# Patient Record
Sex: FEMALE | Race: WHITE | URBAN - METROPOLITAN AREA
[De-identification: names, ages, dates, MRNs, and addresses within clinical notes are randomized per-mention and may not be internally consistent; named-entity substitution may affect disease eponyms.]

---

## 2022-01-04 NOTE — PROGRESS NOTES
Neurology Note    Patient ID:  Jose Antonio Sommer  [id number]  35 y.o.  1945      Date of Consultation:  January 5, 2022    Referring Physician: Dr. Patel Cazares    Reason for Consultation:  neuropathy      Assessment and Plan:    The patient is a pleasant 57-year-old female who presents with a 4+ year history of sensory disturbance in her bilateral lower extremities. Her examination is notable for a length dependent peripheral neuropathy. Peripheral neuropathy:    The differential includes metabolic, inflammatory, vitamin deficiency, idiopathic  I will obtain an EMG/nerve conduction study to determine the severity of the neuropathy and nerve type that is involved. I will obtain serology looking for reversible causes of neuropathy. This will include a vitamin B12, serum immunofixation, thyroid panel. Neuropathy:  we reviewed the causes contributing to the neuropathy. We discussed the importance of exercise and activity. I also reviewed the importance of safety with ambulation and ways to prevents falls. Neuropathic pain:  I did discuss with her medications that can be used for her neuropathic pain symptoms. I discussed medication such as gabapentin, Lyrica, amitriptyline, Cymbalta. She does not feel that the symptoms are severe enough to require a medication at this time. She will keep me updated and if symptoms worsen prescription medications or over-the-counter medications can be considered. Subjective: my feet and legs. History of Present Illness:   Jose Antonio Sommer is a 64 y.o. female who was referred to the neurology clinic at [facility] for an evaluation. The patient does present with her  today who provides additional information. The patient stated that she began to notice crawling sensation around her legs approximately 4 to 5 years ago.   Initially there was a concern about vascular issues and she had vascular testing but those were normal.  Symptoms were in her calves and it felt like a squeezing tight sensation. She then began to notice more symptoms in her toes. At times they feel tight with rubber bands wrapped around them. At other times her foot will feel cold and wet when it is not. She occasionally notices this when walking but also when seated. All of her symptoms are much worse at night. She does not think it wakes her up from sleep but if she does wake up she does notice the symptoms. She does feel that her balance is slightly worse but does attribute this to her vision and hearing issues. She did get new glasses due to double vision and this has helped. She denies having a shower sign. She does not have a dedicated exercise program but does try to stay active. Ophthalmologist did obtain a brain MRI with and without contrast that was performed on October 28, 2021. There is no evidence of a stroke. There was a mild amount of chronic periventricular microvascular ischemic changes    Upon a review of the medical records, the patient was seen by a neurologist in [address] [place] [address]. At that time she had abnormal sensation in her bilateral lower extremities that was up to her knees. It was felt that she had a peripheral neuropathy. Laboratory testing was obtained. Laboratory results available in [facility] included a normal comprehensive metabolic panel, normal CBC. HDL 74, , vitamin D was 22. These labs were all obtained in June 2021. In August 2020 her vitamin B12 level was 297. Past medical history:  Anxiety/depression  Dyslipidemia  Vitamin D deficiency. Gets a migraine once a year. Surgical history:  Left wrist surgery      Family history:  No neuropathy in her family. Social History     Tobacco Use    Smoking status: denies    Smokeless tobacco:    Substance Use Topics    Alcohol use: 0 percent etoh beer.         Not on File     Prior to Admission medications    Medication Sig Start Date End Date Taking? Authorizing Provider   escitalopram oxalate (LEXAPRO) 5 mg tablet  12/14/21  Yes Provider, Historical   cholecalciferol (Vitamin D3) (1000 Units /25 mcg) tablet Take  by mouth daily. Yes Provider, Historical       Review of Systems:    General, constitutional: negative  Eyes, vision: vision difficulties - has 3 pairs of glasses  Ears, nose, throat: hearing loss  Cardiovascular, heart: negative  Respiratory: negative  Gastrointestinal: negative  Genitourinary: negative  Musculoskeletal: negative  Skin and integumentary: negative  Psychiatric: negative  Endocrine: negative  Neurological: negative, except for HPI  Hematologic/lymphatic: negative  Allergy/immunology: negative      Objective:     Visit Vitals  /70 (BP 1 Location: Left upper arm, BP Patient Position: Sitting, BP Cuff Size: Adult)   Pulse 88   Resp 16   Wt 113 lb (51.3 kg)   SpO2 94%       Physical Exam:      General:  appears well nourished in no acute distress  Neck: no carotid bruits  Lungs: clear to auscultation  Heart:  no murmurs, regular rate  Lower extremity: peripheral pulses palpable and no edema  Skin: intact    Neurological exam:    Awake, alert, oriented to person, place and time  Recent and remote memory were normal  Attention and concentration were intact  Language was intact. There was no aphasia  Speech: no dysarthria  Fund of knowledge was preserved    Cranial nerves:   II-XII were tested    Perrrla  Fundoscopic examination revealed venous pulsations and no clear abnormalities  Visual fields were full  Eomi, no evidence of nystagmus  Facial sensation:  normal and symmetric  Facial motor: normal and symmetric  Hearing decreased  SCM strength intact  Tongue: midline without fasciculations    Motor: Tone normal  Pronator drift was absent    No evidence of fasciculations    Strength testing:   deltoid triceps biceps Wrist ext. Wrist flex. intrinsics Hip flex. Hip ext. Knee ext.   Knee flex Dorsi flex Plantar flex   Right 5 5 5 5 5 5 5 5 5 5 5 5   Left 5 5 5 5 5 5 5 5 5 5 5 5         Sensory:  Upper extremity: intact to pp, light touch, and vibration > 10 seconds  Lower extremity: Pinprick was decreased to the level of the ankles. Vibration was present for 4 seconds at the toes and 8 seconds at the ankles. Reflexes:    Right Left  Biceps  2 2  Triceps 2 2  Brachiorad. 2 2  Patella  2+ 2+  Achilles 1 1    Plantar response:  flexor bilaterally      Cerebellar testing:  no tremor apparent, finger/nose and damian were intact    Romberg: absent, mild swaying    Gait: steady. Heel, toe walking was normal.  Mild difficulty with tandem gait. Labs:     No results found for: HBA1C, NA, K, CL, GLU, BUN, CREA, CA, WBC, HCT, HGB, PLT, LDL, [id number], HCTEXT, HGBEXT, PLTEXT, [id number], HCTEXT, HGBEXT, PLTEXT    Imaging:    No results found for this or any previous visit. No results found for this or any previous visit. Imaging and laboratory results as noted in the HPI. There is no problem list on file for this patient. The patient should return to clinic for emg/ncs  Renewed medication: none today. Labs and tests ordered. I spent  65  minutes on the day of the encounter preparing the office visit by reviewing medical records, obtaining a history, performing examination, counseling and educating the patient and family members on diagnosis, ordering tests, documenting in the clinical medical record, and coordinating the care for the patient. The patient had the ability to ask questions and all questions were answered.                  Signed By:  Isidra Ornelas DO FAAN    January 5, 2022

## 2022-01-05 ENCOUNTER — OFFICE VISIT (OUTPATIENT)
Dept: NEUROLOGY | Age: 77
End: 2022-01-05
Payer: MEDICARE

## 2022-01-05 VITALS
OXYGEN SATURATION: 94 % | WEIGHT: 113 LBS | DIASTOLIC BLOOD PRESSURE: 70 MMHG | SYSTOLIC BLOOD PRESSURE: 108 MMHG | HEART RATE: 88 BPM | RESPIRATION RATE: 16 BRPM

## 2022-01-05 DIAGNOSIS — G62.9 NEUROPATHY: Primary | ICD-10-CM

## 2022-01-05 DIAGNOSIS — M79.2 NEUROPATHIC PAIN: ICD-10-CM

## 2022-01-05 DIAGNOSIS — G60.9 IDIOPATHIC PERIPHERAL NEUROPATHY: ICD-10-CM

## 2022-01-05 PROCEDURE — 99205 OFFICE O/P NEW HI 60 MIN: CPT | Performed by: PSYCHIATRY & NEUROLOGY

## 2022-01-05 RX ORDER — MELATONIN
DAILY
COMMUNITY

## 2022-01-05 RX ORDER — ESCITALOPRAM OXALATE 5 MG/1
TABLET ORAL
COMMUNITY
Start: 2021-12-14

## 2022-02-24 ENCOUNTER — OFFICE VISIT (OUTPATIENT)
Dept: NEUROLOGY | Age: 77
End: 2022-02-24
Payer: MEDICARE

## 2022-02-24 DIAGNOSIS — M79.672 PAIN IN BOTH FEET: ICD-10-CM

## 2022-02-24 DIAGNOSIS — M79.671 PAIN IN BOTH FEET: ICD-10-CM

## 2022-02-24 DIAGNOSIS — R20.0 NUMBNESS AND TINGLING OF BOTH FEET: ICD-10-CM

## 2022-02-24 DIAGNOSIS — R20.2 NUMBNESS AND TINGLING OF BOTH FEET: ICD-10-CM

## 2022-02-24 PROCEDURE — 95885 MUSC TST DONE W/NERV TST LIM: CPT | Performed by: PSYCHIATRY & NEUROLOGY

## 2022-02-24 PROCEDURE — 95910 NRV CNDJ TEST 7-8 STUDIES: CPT | Performed by: PSYCHIATRY & NEUROLOGY

## 2022-02-24 NOTE — PROCEDURES
Electrodiagnostic Study Report  Test Date:      Patient: Ross Porter :  Physician: Dr. Paco Hampton   Sex: female Tech: Kendall Wymseeanayeli Tech Ref Phys: Dr. Jewell Osullivan:  Patient is a pleasant female who presents with sensory disturbance throughout her lower extremities. Her examination does reveal 5 out of 5 strength and good bowel    EMG & NCV Findings:      Summary:    Nerve conduction studies as listed below were within reference of normal.      Disposable concentric needle examination of the muscles listed below  was normal.      Impression: This study was normal.  There was no electrodiagnostic evidence upon todays examination suggesting a focal or generalized large fiber neuropathy, myopathy, or radiculopathy. This study cannot exclude a small fiber neuropathy. ___________________________   Monse Andres DO FAAN        Nerve Conduction Studies  Anti Sensory Summary Table     Site NR Peak (ms) Norm Peak (ms) O-P Amp (µV) Norm O-P Amp Site1 Site2 Dist (cm)   Left Sup Fibular Anti Sensory (Lat ankle)  33.4°C   Lower leg    2.8 <4.4 9.2 >5.0 Lower leg Lat ankle 10.0   Right Sup Fibular Anti Sensory (Lat ankle)  33.4°C   Lower leg    2.8 <4.4 11.7 >5.0 Lower leg Lat ankle 10.0   Left Sural Anti Sensory (Lat Mall)  33.4°C   Calf    2.6 <4.4 9.8 >6 Calf Lat Mall 14.0   Right Sural Anti Sensory (Lat Mall)  33.4°C   Calf    2.9 <4.4 17.8 >6 Calf Lat Mall 14.0     Motor Summary Table     Site NR Onset (ms) Norm Onset (ms) O-P* Amp (mV) Norm O-P Amp P-T Amp (mV) Site1 Site2 Dist (cm) Edy (m/s)   Left Fibular Motor (Ext Dig Brev)  33.4°C   Ankle    4.1 <6.1 3.8 >2.5  Ankle Ext Dig Brev 8.0 20   B Fib    12.7  3.8   B Fib Ankle 36.0 42   Poplt    14.3  3.6   Poplt B Fib 10.0 62   Right Fibular Motor (Ext Dig Brev)  33.4°C   Ankle    5.2 <6.1 3.6 >2.5  Ankle Ext Dig Brev 8.0 15   B Fib    12.8  3.1   B Fib Ankle 35.0 46   Poplt    14.4  2.7   Poplt B Fib 10.0 63 Left Tibial Motor (Abd Bernardo Brev)  33.4°C   Ankle    4.6 <6.1 14.1 >3.0  Ankle Abd Bernardo Brev 8.0 17   Knee    15.2  8.4   Knee Ankle 39.0 37   Right Tibial Motor (Abd Bernardo Brev)  33.4°C   Ankle    5.0 <6.1 11.8 >3.0  Ankle Abd Bernardo Brev 8.0 16   Knee    15.3  8.0   Knee Ankle 36.0 35     EMG     Side Muscle Nerve Root Ins Act Fibs Psw Recrt Duration Amp Poly Comment   Left AntTibialis Dp Br Peron L4-5 Nml Nml Nml Nml Nml Nml Nml    Left MedGastroc Tibial S1-2 Nml Nml Nml Nml Nml Nml Nml    Left VastusLat Femoral L2-4 Nml Nml Nml Nml Nml Nml Nml    Left Semitendinosus Sciatic L5-S2 Nml Nml Nml Nml Nml Nml Nml        Waveforms:

## 2022-03-16 ENCOUNTER — TELEPHONE (OUTPATIENT)
Dept: NEUROLOGY | Age: 77
End: 2022-03-16

## 2022-03-17 NOTE — TELEPHONE ENCOUNTER
I called the patient and notified her of the information as well  Deuce Hernadez that he wanted to send something to Dr. Aishwarya Chatterjee about her results and vitamin D? She has not heard from her PCP about this. She is not sure why she does not know anything about what is going on and feels as though she was just shoved out of the door.     I went over everything with her and asked her to follow up with her PCP to discuss further treatments

## 2022-03-17 NOTE — TELEPHONE ENCOUNTER
Hi,    They were discussed with her at length the day of her visit. Her EMG/nerve conduction study was normal.  You can please share this with her again. Thanks!

## 2023-05-17 RX ORDER — ESCITALOPRAM OXALATE 5 MG/1
TABLET ORAL
COMMUNITY
Start: 2021-12-14

## 2024-03-18 NOTE — TELEPHONE ENCOUNTER
Patient would like to discuss her emg results please call the patient at [phone] Lana Jama [phone]